# Patient Record
(demographics unavailable — no encounter records)

---

## 2025-04-01 NOTE — DISCUSSION/SUMMARY
[Met privately with the adolescent for part of the office visit?] : Met privately with the adolescent for part of the office visit? Yes [Adolescent demonstrates understanding of his/her conditions and how to take prescribed medications?] : Adolescent demonstrates understanding of his/her conditions and how to take prescribed medications? Yes [Adolescent asks questions during each office  visit and participates in the care plan?] : Adolescent asks questions during each office visit and participates in the care plan? Yes [Adolescent is competent in independently making appointments, filling prescriptions, following up on referrals, and seeking emergency services, as needed?] : Adolescent is competent in independently making appointments, filling prescriptions, following up on referrals, and seeking emergency services, as needed? Yes [Discussed using [facility] to access health records and communicate with the adolescent's care team?] : Discussed using [facility] My [facility] to access health records and communicate with the adolescent's care team? Yes [Initiated discussion about transfer to an adult healthcare provider?] : Initiated discussion about transfer to an adult healthcare provider? Yes  [Discussed choices for adult care and assist in identifying possible care providers?] : Discussed choices for adult care and assist in identifying possible care providers? Yes [Initiated communication with the adult provider that the family and adolescent has selected?] : Initiated communication with the adult provider that the family and adolescent has selected? No [Provided copy of  transition letter?] : Provided copy of transition letter? No [FreeTextEntry1] : Continue balanced diet with all food groups. Brush teeth twice a day with toothbrush. Recommend visit to dentist. Maintain consistent daily routines and sleep schedule. Personal hygiene, puberty, and sexual health reviewed. Risky behaviors assessed. School discussed. Limit screen time to no more than 2 hours per day. Encourage physical activity. Cardiac questionnaire reviewed, NO issues. Sent for routine labs and Hep C Discussed in the preferred language of English Return 1 year for routine well child check.

## 2025-04-01 NOTE — RISK ASSESSMENT
[PHQ-9 Negative - No further assessment needed] : PHQ-9 Negative - No further assessment needed [No Increased risk of SCA or SCD] : No Increased risk of SCA or SCD    [Yes] : Patient consents to screening. [Have you ever fainted, passed out or had an unexplained seizure suddenly and without warning, especially during exercise or in response] : Have you ever fainted, passed out or had an unexplained seizure suddenly and without warning, especially during exercise or in response to sudden loud noises such as doorbells, alarm clocks and ringing telephones? No [Have you ever had exercise-related chest pain or shortness of breath?] : Have you ever had exercise-related chest pain or shortness of breath? No [Has anyone in your immediate family (parents, grandparents, siblings) or other more distant relatives (aunts, uncles, cousins)  of heart] : Has anyone in your immediate family (parents, grandparents, siblings) or other more distant relatives (aunts, uncles, cousins)  of heart problems or had an unexpected sudden death before age 50 (This would include unexpected drownings, unexplained car accidents in which the relative was driving or sudden infant death syndrome.)? No [Are you related to anyone with hypertrophic cardiomyopathy or hypertrophic obstructive cardiomyopathy, Marfan syndrome, arrhythmogenic] : Are you related to anyone with hypertrophic cardiomyopathy or hypertrophic obstructive cardiomyopathy, Marfan syndrome, arrhythmogenic right ventricular cardiomyopathy, long QT syndrome, short QT syndrome, Brugada syndrome or catecholaminergic polymorphic ventricular tachycardia, or anyone younger than 50 years with a pacemaker or implantable defibrillator? No

## 2025-04-01 NOTE — HISTORY OF PRESENT ILLNESS
[Yes] : Patient goes to dentist yearly [No] : No cigarette smoke exposure [Has ways to cope with stress] : has ways to cope with stress [Displays self-confidence] : displays self-confidence [With Teen] : teen [NO] : No [Has problems with sleep] : does not have problems with sleep [Gets depressed, anxious, or irritable/has mood swings] : does not get depressed, anxious, or irritable/has mood swings [Has thought about hurting self or considered suicide] : has not thought about hurting self or considered suicide [FreeTextEntry1] : Patient is doing well - has no concerns or issues. No change in health status since last [facility] Asthma stable, has used it after vigorous exercise No chest pains or difficulty of breathing reported No reactions to previous vaccinations. Denies depression or psychiatric issues. No mental health issues, not in counseling. No suicidal ideations Appetite good, no issues No new allergies reported Not sexually active Denies tobacco, ETOH, drug use or vaping No tobacco exposure Sleeping well with good sleeping patterns No recent severe illness or injury No emergency room visits No trauma to the head /concussion. [employer], wants to be a  No problems in school identified -  no ADD/ADHD concerns PHQ9 and CRAFFT reviewed, no issues Coordination of Care reviewed, no issues Cardiac questionnaire reviewed, no issues Has been to the dentist within last 12 months